# Patient Record
Sex: FEMALE | Race: WHITE | Employment: FULL TIME | URBAN - NONMETROPOLITAN AREA
[De-identification: names, ages, dates, MRNs, and addresses within clinical notes are randomized per-mention and may not be internally consistent; named-entity substitution may affect disease eponyms.]

---

## 2017-01-10 ENCOUNTER — COMMUNICATION - GICH (OUTPATIENT)
Dept: FAMILY MEDICINE | Facility: OTHER | Age: 32
End: 2017-01-10

## 2017-01-10 DIAGNOSIS — R30.0 DYSURIA: ICD-10-CM

## 2017-01-10 DIAGNOSIS — N39.0 URINARY TRACT INFECTION: ICD-10-CM

## 2018-01-02 NOTE — TELEPHONE ENCOUNTER
"Patient Information     Patient Name MRN Laverne Mccormick [id number] Female 1985      Telephone Encounter by Daria Encinas RN at 1/10/2017  1:51 PM     Author:  Daria Encinas RN Service:  (none) Author Type:  NURS- Registered Nurse     Filed:  1/10/2017  1:52 PM Encounter Date:  1/10/2017 Status:  Signed     :  Daria Encinas RN (NURS- Registered Nurse)            Fax received from pharmacy stating \"please disregard refill request\"  Daria Encinas RN........1/10/2017 1:51 PM;e          "

## 2018-01-29 ENCOUNTER — DOCUMENTATION ONLY (OUTPATIENT)
Dept: FAMILY MEDICINE | Facility: OTHER | Age: 33
End: 2018-01-29

## 2018-01-29 PROBLEM — Z72.0 TOBACCO ABUSE: Status: ACTIVE | Noted: 2018-01-29

## 2018-01-29 PROBLEM — N94.6 DYSMENORRHEA: Status: ACTIVE | Noted: 2018-01-29

## 2018-01-29 PROBLEM — F33.9 MAJOR DEPRESSIVE DISORDER, RECURRENT EPISODE (H): Status: ACTIVE | Noted: 2018-01-29

## 2018-01-29 PROBLEM — R51.9 HEADACHE: Status: ACTIVE | Noted: 2018-01-29

## 2018-01-29 RX ORDER — MULTIVIT WITH MINERALS/LUTEIN
1000 TABLET ORAL DAILY
COMMUNITY

## 2018-01-29 RX ORDER — ONDANSETRON 4 MG/1
4 TABLET, ORALLY DISINTEGRATING ORAL EVERY 8 HOURS PRN
COMMUNITY
Start: 2016-10-19

## 2022-06-15 ENCOUNTER — TELEPHONE (OUTPATIENT)
Dept: FAMILY MEDICINE | Facility: OTHER | Age: 37
End: 2022-06-15

## 2022-06-15 NOTE — TELEPHONE ENCOUNTER
....Left message to call back.  Duong Rushing, LPNLPN....................  6/15/2022   11:59 AM

## 2022-06-15 NOTE — TELEPHONE ENCOUNTER
LMTCB and schedule per op with [facility] ok to use same day.    Anny Avendano on 6/15/2022 at 12:14 PM

## 2022-06-15 NOTE — TELEPHONE ENCOUNTER
Patient needs a preop done by 06/28/22 for cataracts surgery by Dr Ba[facility]uelos, [facility] in [place].      Can someone work patient into their schedule.    Thank you   Melissa Valiente on 6/15/2022 at 11:33 AM

## 2024-09-03 ENCOUNTER — OFFICE VISIT (OUTPATIENT)
Dept: FAMILY MEDICINE | Facility: OTHER | Age: 39
End: 2024-09-03
Attending: FAMILY MEDICINE
Payer: COMMERCIAL

## 2024-09-03 VITALS
OXYGEN SATURATION: 100 % | BODY MASS INDEX: 28.61 KG/M2 | WEIGHT: 155.5 LBS | TEMPERATURE: 98.1 F | HEIGHT: 62 IN | HEART RATE: 82 BPM | RESPIRATION RATE: 18 BRPM | DIASTOLIC BLOOD PRESSURE: 76 MMHG | SYSTOLIC BLOOD PRESSURE: 130 MMHG

## 2024-09-03 DIAGNOSIS — R39.15 URINARY URGENCY: ICD-10-CM

## 2024-09-03 DIAGNOSIS — N39.0 ACUTE UTI (URINARY TRACT INFECTION): Primary | ICD-10-CM

## 2024-09-03 DIAGNOSIS — N89.8 VAGINAL ITCHING: ICD-10-CM

## 2024-09-03 DIAGNOSIS — R35.0 URINARY FREQUENCY: ICD-10-CM

## 2024-09-03 DIAGNOSIS — N89.8 VAGINAL DISCHARGE: ICD-10-CM

## 2024-09-03 DIAGNOSIS — B37.31 CANDIDIASIS OF VAGINA: ICD-10-CM

## 2024-09-03 DIAGNOSIS — R31.29 OTHER MICROSCOPIC HEMATURIA: ICD-10-CM

## 2024-09-03 LAB
ALBUMIN UR-MCNC: 10 MG/DL
APPEARANCE UR: CLEAR
BACTERIA #/AREA URNS HPF: ABNORMAL /HPF
BACTERIAL VAGINOSIS VAG-IMP: NEGATIVE
BILIRUB UR QL STRIP: NEGATIVE
CANDIDA DNA VAG QL NAA+PROBE: DETECTED
CANDIDA GLABRATA / CANDIDA KRUSEI DNA: NOT DETECTED
COLOR UR AUTO: YELLOW
GLUCOSE UR STRIP-MCNC: NEGATIVE MG/DL
HGB UR QL STRIP: NEGATIVE
KETONES UR STRIP-MCNC: 100 MG/DL
LEUKOCYTE ESTERASE UR QL STRIP: ABNORMAL
MUCOUS THREADS #/AREA URNS LPF: PRESENT /LPF
NITRATE UR QL: NEGATIVE
PH UR STRIP: 5.5 [PH] (ref 5–9)
RBC URINE: 4 /HPF
SP GR UR STRIP: 1.02 (ref 1–1.03)
SQUAMOUS EPITHELIAL: 2 /HPF
T VAGINALIS DNA VAG QL NAA+PROBE: NOT DETECTED
UROBILINOGEN UR STRIP-MCNC: NORMAL MG/DL
WBC URINE: 13 /HPF

## 2024-09-03 PROCEDURE — 81001 URINALYSIS AUTO W/SCOPE: CPT | Mod: ZL

## 2024-09-03 PROCEDURE — 87086 URINE CULTURE/COLONY COUNT: CPT | Mod: ZL

## 2024-09-03 PROCEDURE — 99203 OFFICE O/P NEW LOW 30 MIN: CPT

## 2024-09-03 PROCEDURE — 0352U MULTIPLEX VAGINAL PANEL BY PCR: CPT | Mod: ZL

## 2024-09-03 RX ORDER — SULFAMETHOXAZOLE/TRIMETHOPRIM 800-160 MG
1 TABLET ORAL 2 TIMES DAILY
Qty: 10 TABLET | Refills: 0 | Status: SHIPPED | OUTPATIENT
Start: 2024-09-03 | End: 2024-09-08

## 2024-09-03 ASSESSMENT — PAIN SCALES - GENERAL: PAINLEVEL: NO PAIN (0)

## 2024-09-03 NOTE — NURSING NOTE
"Chief Complaint   Patient presents with    Vaginal Problem     Inflammation, yellow discharge, itching x 8 days       Initial /76 (BP Location: Left arm, Patient Position: Sitting, Cuff Size: Adult Regular)   Pulse 82   Temp 98.1  F (36.7  C) (Tympanic)   Resp 18   Ht 1.575 m (5' 2\")   Wt 70.5 kg (155 lb 8 oz)   SpO2 100%   BMI 28.44 kg/m   Estimated body mass index is 28.44 kg/m  as calculated from the following:    Height as of this encounter: 1.575 m (5' 2\").    Weight as of this encounter: 70.5 kg (155 lb 8 oz).  Medication Review: complete    The next two questions are to help us understand your food security.  If you are feeling you need any assistance in this area, we have resources available to support you today.           No data to display                  Health Care Directive:  Patient does not have a Health Care Directive or Living Will: Discussed advance care planning with patient; however, patient declined at this time.    Jaz Beatty LPN      "

## 2024-09-03 NOTE — PROGRESS NOTES
ASSESSMENT/PLAN:    I have reviewed the nursing notes.  I have reviewed the findings, diagnosis, plan and need for follow up with the patient.    1. Urinary urgency  2. Urinary frequency  3. Vaginal itching  4. Vaginal discharge    - UA with Microscopic reflex to Culture- Ketones 100, protein albumin 10, moderate amount of leukocytes, few bacteria, mucus present, RBC urine > 4, WBC urine > 13, > 2 epithelials.     - Multiplex Vaginal Panel by PCR- Positive for yeast.    - Urine Culture- Results pending    5. Acute UTI (urinary tract infection)  6. Other microscopic hematuria    - sulfamethoxazole-trimethoprim (BACTRIM DS) 800-160 MG tablet; Take 1 tablet by mouth 2 times daily for 5 days.  Dispense: 10 tablet; Refill: 0    - Please read the attached information on bladder diet and UTI in women for at home care treatment.    7. Candidiasis of vagina    - fluconazole (DIFLUCAN) 200 MG tablet; Take 1 tablet (200 mg) by mouth every 3 days for 3 doses.  Dispense: 3 tablet; Refill: 0    - Please read the attached information on vaginal yeast infection for at home care treatment.     - May use over-the-counter Tylenol or ibuprofen as needed for pain, inflammation or fever    - Discussed warning signs/symptoms indicative of need to f/u    - Follow up if symptoms persist or worsen or concerns    - I explained my diagnostic considerations and recommendations to the patient, who voiced understanding and agreement with the treatment plan. All questions were answered. We discussed potential side effects of any prescribed or recommended therapies, as well as expectations for response to treatments.    KASH Riley CNP  9/3/2024  5:43 PM    HPI:    Laverne Medina is a 38 year old female who presents to [facility] today for concerns of possible yeast infection.  Patient states that for the past several days she has been experiencing urinary urgency and frequency along with an abnormal amount of yellowish colored vaginal  "discharge and vaginal itching.  Patient denies fever, chills, low back or flank pain, hematuria, foul-smelling discharge or urine, shortness of breath, chest pain, nausea, vomiting, diarrhea, constipation, headache, lightheadedness, dizziness or rash.  States that she has increased her water intake the past couple of days but otherwise has not done any over-the-counter medications.    Past Medical History:   Diagnosis Date    Appendicitis     2002    Personal history of other medical treatment (CODE)     G2 P 1-1-0-2    Presence of (intrauterine) contraceptive device     08,ParaGard IUD placed, removed 09 after her  had a vasectomy.     Past Surgical History:   Procedure Laterality Date    APPENDECTOMY OPEN      02,Open appendectomy for acute appendicitis     SECTION      06     SECTION      2008,Repeat  section -  labor     Social History     Tobacco Use    Smoking status: Former     Current packs/day: 0.50     Average packs/day: 0.5 packs/day for 7.0 years (3.5 ttl pk-yrs)     Types: Cigarettes    Smokeless tobacco: Never   Substance Use Topics    Alcohol use: Yes     Comment: Alcoholic Drinks/day: moderate     Current Outpatient Medications   Medication Sig Dispense Refill    ascorbic acid (VITAMIN C) 1000 MG TABS Take 1,000 mg by mouth daily      ondansetron (ZOFRAN-ODT) 4 MG ODT tab Place 4 mg under the tongue every 8 hours as needed (Patient not taking: Reported on 9/3/2024)       Allergies   Allergen Reactions    Latex Rash    Nitrofurantoin Rash     Past medical history, past surgical history, current medications and allergies reviewed and accurate to the best of my knowledge.      ROS:  Refer to HPI    /76 (BP Location: Left arm, Patient Position: Sitting, Cuff Size: Adult Regular)   Pulse 82   Temp 98.1  F (36.7  C) (Tympanic)   Resp 18   Ht 1.575 m (5' 2\")   Wt 70.5 kg (155 lb 8 oz)   SpO2 100%   BMI 28.44 kg/m  "     EXAM:  General Appearance: Well appearing 38 year old female, appropriate appearance for age. No acute distress   Respiratory: normal chest wall and respirations.  Normal effort.  Clear to auscultation bilaterally, no wheezing, crackles or rhonchi.  No increased work of breathing.  No cough appreciated.  Cardiac: RRR with no murmurs  Abdomen: soft, nontender, no rigidity, no rebound tenderness or guarding, normal bowel sounds present  :  No suprapubic tenderness to palpation.  Absent CVA tenderness to palpation.    Musculoskeletal:  Equal movement of bilateral upper extremities.  Equal movement of bilateral lower extremities.  Normal gait.    Neuro: Alert and oriented to person, place, and time.    Psychological: normal affect, alert, oriented, and pleasant.     Labs:  Results for orders placed or performed in visit on 09/03/24   UA with Microscopic reflex to Culture     Status: Abnormal    Specimen: Urine, Midstream   Result Value Ref Range    Color Urine Yellow Colorless, Straw, Light Yellow, Yellow    Appearance Urine Clear Clear    Glucose Urine Negative Negative mg/dL    Bilirubin Urine Negative Negative    Ketones Urine 100 (A) Negative mg/dL    Specific Gravity Urine 1.018 1.000 - 1.030    Blood Urine Negative Negative    pH Urine 5.5 5.0 - 9.0    Protein Albumin Urine 10 (A) Negative mg/dL    Urobilinogen Urine Normal Normal, 2.0 mg/dL    Nitrite Urine Negative Negative    Leukocyte Esterase Urine Moderate (A) Negative    Bacteria Urine Few (A) None Seen /HPF    Mucus Urine Present (A) None Seen /LPF    RBC Urine 4 (H) <=2 /HPF    WBC Urine 13 (H) <=5 /HPF    Squamous Epithelials Urine 2 (H) <=1 /HPF    Narrative    Urine Culture ordered based on laboratory criteria   Multiplex Vaginal Panel by PCR     Status: Abnormal    Specimen: Vagina; Swab   Result Value Ref Range    Bacterial Vaginosis Organism DNA Negative Negative    Candida Group DNA Detected (A) Not Detected    Candida glabrata / Candida  krusei DNA Not Detected Not Detected    Trichomonas vaginalis DNA Not Detected Not Detected    Narrative    The Xpert  Xpress MVP test, performed on the [employer], is an automated, qualitative in vitro diagnostic test for the detection of DNA targets from anaerobic bacteria associated with bacterial vaginosis, Candida species associated with vulvovaginal candidiasis, and Trichomonas vaginalis. The assay uses clinician-collected and self-collected vaginal swabs from patients who are symptomatic for vaginitis/ vaginosis. The Xpert  Xpress MVP test utilizes real-time polymerase chain reaction (PCR) for the amplification of specific DNA targets and utilizes fluorogenic target-specific hybridization probes to detect and differentiate DNA. It is intended to aid in the diagnosis of vaginal infections in women with a clinical presentation consistent with bacterial vaginosis, vulvovaginal candidiasis, or trichomoniasis.   The assay targets three anaerobic microorgansims that are associated with bacterial vaginosis (BV). Other organisms that are not detected by the Xpert  Xpress MVP test have also been reported to be associated with BV. The BV organism and Candida species targets of the Xpert  Xpress MVP test can be commensal in women; positive results must be considered in conjunction with other clinical and patient information to determine the disease status.

## 2024-09-04 RX ORDER — FLUCONAZOLE 200 MG/1
200 TABLET ORAL
Qty: 3 TABLET | Refills: 0 | Status: SHIPPED | OUTPATIENT
Start: 2024-09-04 | End: 2024-09-11

## 2024-09-04 NOTE — PATIENT INSTRUCTIONS
Urinary Tract Infection (UTI) in Women: Care Instructions  Overview     A urinary tract infection (UTI) is an infection caused by bacteria. It can happen anywhere in the urinary tract. A UTI can happen in the:  Kidneys.  Ureters, the tubes that connect the kidneys to the bladder.  Bladder.  Urethra, where the urine comes out.  Most UTIs are bladder infections. They often cause pain or burning when you urinate.  Most UTIs can be cured with antibiotics. If you are prescribed antibiotics, be sure to complete your treatment so that the infection does not get worse.  Follow-up care is a key part of your treatment and safety. Be sure to make and go to all appointments, and call your doctor if you are having problems. It's also a good idea to know your test results and keep a list of the medicines you take.  How can you care for yourself at home?  Take your antibiotics as directed. Do not stop taking them just because you feel better. You need to take the full course of antibiotics.  Drink extra water and other fluids for the next day or two. This will help make the urine less concentrated and help wash out the bacteria that are causing the infection. (If you have kidney, heart, or liver disease and have to limit fluids, talk with your doctor before you increase the amount of fluids you drink.)  Avoid drinks that are carbonated or have caffeine. They can irritate the bladder.  Urinate often. Try to empty your bladder each time.  To relieve pain, take a hot bath or lay a heating pad set on low over your lower belly or genital area. Never go to sleep with a heating pad in place.  To prevent UTIs  Drink plenty of water each day. This helps you urinate often, which clears bacteria from your system. (If you have kidney, heart, or liver disease and have to limit fluids, talk with your doctor before you increase the amount of fluids you drink.)  Urinate when you need to.  If you are sexually active, urinate right after you have  "sex.  Change sanitary pads often.  Avoid douches, bubble baths, feminine hygiene sprays, and other feminine hygiene products that have deodorants.  After going to the bathroom, wipe from front to back.  When should you call for help?   Call your doctor now or seek immediate medical care if:    You have new or worse fever, chills, nausea, or vomiting.     You have new pain in your back just below your rib cage. This is called flank pain.     There is new blood or pus in your urine.     You have any problems with your antibiotic medicine.   Watch closely for changes in your health, and be sure to contact your doctor if:    You are not getting better after taking an antibiotic for 2 days.     Your symptoms go away but then come back.   Where can you learn more?  Go to https://www.[employer].net/patiented  Enter K848 in the search box to learn more about \"Urinary Tract Infection (UTI) in Women: Care Instructions.\"  Current as of: November 15, 2023               Content Version: 14.0    1330-6930 [employer].   Care instructions adapted under license by your healthcare professional. If you have questions about a medical condition or this instruction, always ask your healthcare professional. [employer] disclaims any warranty or liability for your use of this information.      "

## 2024-09-04 NOTE — RESULT ENCOUNTER NOTE
Please let patient know that she was positive for yeast infection as well so I sent in RX for diflucan to take every 3 days for total of three doses.

## 2024-09-05 LAB — BACTERIA UR CULT: NORMAL

## 2024-09-05 NOTE — RESULT ENCOUNTER NOTE
Please let patient know that her urine culture grew a mixture of organism types,  go ahead and continue her antibiotic as prescribed

## 2024-11-19 ENCOUNTER — OFFICE VISIT (OUTPATIENT)
Dept: FAMILY MEDICINE | Facility: OTHER | Age: 39
End: 2024-11-19
Attending: NURSE PRACTITIONER
Payer: COMMERCIAL

## 2024-11-19 VITALS
DIASTOLIC BLOOD PRESSURE: 80 MMHG | HEART RATE: 71 BPM | HEIGHT: 62 IN | WEIGHT: 151 LBS | SYSTOLIC BLOOD PRESSURE: 144 MMHG | OXYGEN SATURATION: 98 % | BODY MASS INDEX: 27.79 KG/M2 | RESPIRATION RATE: 16 BRPM | TEMPERATURE: 98.4 F

## 2024-11-19 DIAGNOSIS — R21 RASH: ICD-10-CM

## 2024-11-19 DIAGNOSIS — Z20.818 EXPOSURE TO STREP THROAT: ICD-10-CM

## 2024-11-19 DIAGNOSIS — R07.0 THROAT PAIN: ICD-10-CM

## 2024-11-19 DIAGNOSIS — J02.0 ACUTE STREPTOCOCCAL PHARYNGITIS: Primary | ICD-10-CM

## 2024-11-19 LAB — GROUP A STREP BY PCR: NOT DETECTED

## 2024-11-19 PROCEDURE — 87651 STREP A DNA AMP PROBE: CPT | Mod: ZL

## 2024-11-19 RX ORDER — PENICILLIN V POTASSIUM 500 MG/1
500 TABLET, FILM COATED ORAL 2 TIMES DAILY
Qty: 20 TABLET | Refills: 0 | Status: SHIPPED | OUTPATIENT
Start: 2024-11-19 | End: 2024-11-29

## 2024-11-19 RX ORDER — TRIAMCINOLONE ACETONIDE 1 MG/G
CREAM TOPICAL
Qty: 45 G | Refills: 0 | Status: SHIPPED | OUTPATIENT
Start: 2024-11-19

## 2024-11-19 ASSESSMENT — ENCOUNTER SYMPTOMS
SINUS PAIN: 0
ENDOCRINE NEGATIVE: 1
COUGH: 0
SINUS PRESSURE: 0
MUSCULOSKELETAL NEGATIVE: 1
VOMITING: 0
CARDIOVASCULAR NEGATIVE: 1
SORE THROAT: 1
SHORTNESS OF BREATH: 0
EYES NEGATIVE: 1
HEMATOLOGIC/LYMPHATIC NEGATIVE: 1
FEVER: 1
NEUROLOGICAL NEGATIVE: 1
NAUSEA: 0
ALLERGIC/IMMUNOLOGIC NEGATIVE: 1

## 2024-11-19 ASSESSMENT — PAIN SCALES - GENERAL: PAINLEVEL_OUTOF10: SEVERE PAIN (6)

## 2024-11-19 NOTE — PROGRESS NOTES
Laverne Medina  1985    ASSESSMENT/PLAN    1. Acute streptococcal pharyngitis (Primary)  - penicillin V (VEETID) 500 MG tablet; Take 1 tablet (500 mg) by mouth 2 times daily for 10 days.  Dispense: 20 tablet; Refill: 0  2. Exposure to strep throat  - Group A Streptococcus PCR Throat Swab  3. Rash  - Group A Streptococcus PCR Throat Swab  - triamcinolone (KENALOG) 0.1 % external cream; Apply to affected areas of skin 1-2 times per day for up to 1 week at a time.  Dispense: 45 g; Refill: 0  4. Throat pain  - Group A Streptococcus PCR Throat Swab      -Based on centor criteria(4/5 points, 51-53% likelihood of strep per MD Calc) we will treat empirically with penicillin V even though strep test is negative.  - Rash seems most likely related to sequela from strep infection. If this does not resolve after treatment with penicillin v, please follow up for further workup. Triamcinolone cream was provided for itch.  - Symptomatic treatment - Encouraged fluids, salt water gargles, honey, humidifier, saline nasal spray, lozenges, tea, soup, smoothies, popsicles, topical vapor rub, rest, etc   - May use over-the-counter Tylenol or ibuprofen PRN  - Follow up as needed for new or worsening symptoms          *Explanation of diagnosis, treatment options and risk and benefits of medications reviewed with patient. Patient agrees with plan of care.  *All questions were answered.    *Red flags symptoms were discussed and patient was advised when they should return for reevaluation or for prompt emergency evaluation.   *Patient was given verbal and written instructions on plan of care. Instructions were printed or are available on [employer] on electronic AVS.   *We discussed potential side effects of any prescribed or recommended therapies, as well as expectations for response to treatments.  *Patient discharged in stable condition    Bonifacio Trammell, MARYAN, APRN, FNP-C  [facility]    SUBJECTIVE  CHIEF COMPLAINT/  "REASON FOR VISIT  Patient presents with:  Throat Problem: X 6 days  Derm Problem: This am     HISTORY OF PRESENT ILLNESS  Laverne Medina is a pleasant 38 year old female [facility] and rash.  Approximately 6 days ago patient developed a sore throat.  She did have a known exposure to strep while at work. On Saturday and Sunday of this week she also developed a fever and the pain in her throat remained 7 out of 10 pain.  She endorses that her throat feels like cotton and very painful.  She has been taking Tylenol since Saturday due to fever.  Today she developed a widespread rash primarily to anterior chest and anterior arms.  This rash is itchy.  She also endorses a swollen lymph node to the left lateral neck and tenderness to anterior neck.       History provided by patient      I have reviewed the nursing notes.  I have reviewed allergies, medication list, problem list, and past medical history.    REVIEW OF SYSTEMS  Review of Systems   Constitutional:  Positive for fever.   HENT:  Positive for sore throat. Negative for ear pain, sinus pressure and sinus pain.    Eyes: Negative.    Respiratory:  Negative for cough and shortness of breath.    Cardiovascular: Negative.    Gastrointestinal:  Negative for nausea and vomiting.   Endocrine: Negative.    Genitourinary: Negative.    Musculoskeletal: Negative.    Skin:  Positive for rash.   Allergic/Immunologic: Negative.    Neurological: Negative.    Hematological: Negative.         VITAL SIGNS  Vitals:    11/19/24 1502 11/19/24 1503   BP: (!) 144/85 (!) 144/80   BP Location: Right arm Right arm   Patient Position: Sitting Sitting   Cuff Size: Adult Regular Adult Regular   Pulse: 71    Resp: 16    Temp: 98.4  F (36.9  C)    TempSrc: Tympanic    SpO2: 98%    Weight: 68.5 kg (151 lb)    Height: 1.575 m (5' 2\")       Body mass index is 27.62 kg/m .      OBJECTIVE  PHYSICAL EXAM  Physical Exam  Vitals and nursing note reviewed. "   Constitutional:       General: She is not in acute distress.     Appearance: Normal appearance. She is normal weight. She is not ill-appearing, toxic-appearing or diaphoretic.   HENT:      Head: Normocephalic and atraumatic.      Right Ear: Tympanic membrane, ear canal and external ear normal. There is no impacted cerumen.      Left Ear: Tympanic membrane, ear canal and external ear normal. There is no impacted cerumen.      Nose: Nose normal. No congestion or rhinorrhea.      Mouth/Throat:      Mouth: Mucous membranes are moist.      Pharynx: Uvula midline. Posterior oropharyngeal erythema present. No uvula swelling.      Tonsils: No tonsillar exudate.      Comments: Small ulcer to posterior pharynx  Eyes:      Extraocular Movements: Extraocular movements intact.      Conjunctiva/sclera: Conjunctivae normal.      Pupils: Pupils are equal, round, and reactive to light.   Cardiovascular:      Rate and Rhythm: Normal rate and regular rhythm.      Pulses: Normal pulses.      Heart sounds: Normal heart sounds. No murmur heard.  Pulmonary:      Effort: Pulmonary effort is normal. No respiratory distress.      Breath sounds: Normal breath sounds. No wheezing or rhonchi.   Abdominal:      General: Abdomen is flat. Bowel sounds are normal.      Palpations: Abdomen is soft.   Musculoskeletal:      Cervical back: Normal range of motion and neck supple. Tenderness present. No rigidity.   Lymphadenopathy:      Cervical: Cervical adenopathy present.   Skin:     General: Skin is warm and dry.      Capillary Refill: Capillary refill takes less than 2 seconds.      Findings: Erythema and rash present. Rash is macular, papular and purpuric.      Comments: Erythematous maculopapular rash to anterior chest and anterior arms   Neurological:      Mental Status: She is alert.            DIAGNOSTICS  Results for orders placed or performed in visit on 11/19/24   Group A Streptococcus PCR Throat Swab     Status: Normal    Specimen:  Throat; Swab   Result Value Ref Range    Group A strep by PCR Not Detected Not Detected    Narrative    The Xpert Xpress Strep A test, performed on the [employer], is a rapid, qualitative in vitro diagnostic test for the detection of Streptococcus pyogenes (Group A ß-hemolytic Streptococcus, Strep A) in throat swab specimens from patients with signs and symptoms of pharyngitis. The Xpert Xpress Strep A test can be used as an aid in the diagnosis of Group A Streptococcal pharyngitis. The assay is not intended to monitor treatment for Group A Streptococcus infections. The Xpert Xpress Strep A test utilizes an automated real-time polymerase chain reaction (PCR) to detect Streptococcus pyogenes DNA.

## 2024-11-19 NOTE — NURSING NOTE
"Chief Complaint   Patient presents with    Throat Problem     X 6 days    Derm Problem     This am     Patient tx with tylenol  Exposure to strep at work    Initial BP (!) 144/80 (BP Location: Right arm, Patient Position: Sitting, Cuff Size: Adult Regular)   Pulse 71   Temp 98.4  F (36.9  C) (Tympanic)   Resp 16   Ht 1.575 m (5' 2\")   Wt 68.5 kg (151 lb)   SpO2 98%   BMI 27.62 kg/m   Estimated body mass index is 27.62 kg/m  as calculated from the following:    Height as of this encounter: 1.575 m (5' 2\").    Weight as of this encounter: 68.5 kg (151 lb).     Advance Care Directive on file? n    FOOD SECURITY SCREENING QUESTIONS:    The next two questions are to help us understand your food security.  If you are feeling you need any assistance in this area, we have resources available to support you today.    Hunger Vital Signs:  Within the past 12 months we worried whether our food would run out before we got money to buy more. Never  Within the past 12 months the food we bought just didn't last and we didn't have money to get more. Never  Penelope Ayala LPN,LPN on 11/19/2024 at 3:05 PM      Penelope Ayala LPN     "

## 2024-11-26 ENCOUNTER — OFFICE VISIT (OUTPATIENT)
Dept: OBGYN | Facility: OTHER | Age: 39
End: 2024-11-26
Attending: STUDENT IN AN ORGANIZED HEALTH CARE EDUCATION/TRAINING PROGRAM
Payer: COMMERCIAL

## 2024-11-26 VITALS — HEART RATE: 75 BPM | SYSTOLIC BLOOD PRESSURE: 122 MMHG | DIASTOLIC BLOOD PRESSURE: 72 MMHG

## 2024-11-26 DIAGNOSIS — N89.8 VAGINAL DISCHARGE: ICD-10-CM

## 2024-11-26 DIAGNOSIS — R10.2 PELVIC PAIN IN FEMALE: Primary | ICD-10-CM

## 2024-11-26 LAB
BACTERIAL VAGINOSIS VAG-IMP: NEGATIVE
CANDIDA DNA VAG QL NAA+PROBE: NOT DETECTED
CANDIDA GLABRATA / CANDIDA KRUSEI DNA: NOT DETECTED
T VAGINALIS DNA VAG QL NAA+PROBE: NOT DETECTED

## 2024-11-26 PROCEDURE — 0352U MULTIPLEX VAGINAL PANEL BY PCR: CPT | Mod: ZL | Performed by: STUDENT IN AN ORGANIZED HEALTH CARE EDUCATION/TRAINING PROGRAM

## 2024-11-26 RX ORDER — TRIAMCINOLONE ACETONIDE 1 MG/G
CREAM TOPICAL 2 TIMES DAILY
Qty: 45 G | Refills: 0 | Status: SHIPPED | OUTPATIENT
Start: 2024-11-26 | End: 2024-12-03

## 2024-11-26 RX ORDER — CLOTRIMAZOLE 1 %
1 CREAM WITH APPLICATOR VAGINAL AT BEDTIME
Qty: 45 G | Refills: 0 | Status: SHIPPED | OUTPATIENT
Start: 2024-11-26 | End: 2024-12-06

## 2024-11-26 ASSESSMENT — PATIENT HEALTH QUESTIONNAIRE - PHQ9
SUM OF ALL RESPONSES TO PHQ QUESTIONS 1-9: 0
10. IF YOU CHECKED OFF ANY PROBLEMS, HOW DIFFICULT HAVE THESE PROBLEMS MADE IT FOR YOU TO DO YOUR WORK, TAKE CARE OF THINGS AT HOME, OR GET ALONG WITH OTHER PEOPLE: NOT DIFFICULT AT ALL
SUM OF ALL RESPONSES TO PHQ QUESTIONS 1-9: 0

## 2024-11-26 ASSESSMENT — PAIN SCALES - GENERAL: PAINLEVEL_OUTOF10: MODERATE PAIN (5)

## 2024-11-26 NOTE — NURSING NOTE
"Chief Complaint   Patient presents with    Consult     Patient is here for consult to discuss hormones. Patient states that she feels like a cooling sensation that comes and goes, pelvic pain that comes and goes. Patient is not having any abnormal discharge or odor. Patient is wanting labs to be checked for hormones and DM labs.   Initial /72   Pulse 75  Estimated body mass index is 27.62 kg/m  as calculated from the following:    Height as of 11/19/24: 1.575 m (5' 2\").    Weight as of 11/19/24: 68.5 kg (151 lb).  Medication Reconciliation: complete      Jeimy Adler LPN    "

## 2024-11-26 NOTE — PROGRESS NOTES
"Gynecology Office Visit    Chief Complaint: Hormone questions    HPI:    Laverne Medina is a 39 year old  here for discussion of hormones and concern for persistent vaginal yeast infection. She notes that her perineum and vagina have a tingling, \"cold\" feeling that has been persistent throughout this fall. She has previously been tested for bacterial vaginosis, yeast and Trichomonas. She tested positive for yeast and was treated with diflucan, it then returned in November. She was then given a three day course treatment.    She has a gyn history of a laparoscopic-assisted vaginal hysterectomy, bilateral salpingectomy, right oophorectomy in 2016. The right ovary had a mature cystic teratoma present (path reviewed from 2016).    She notes intermittent lower pelvic pain. It seems to start on her left side but radiates and settles across the midline as well.    OBHx  OB History   No obstetric history on file.   Two prior C-sections      GYN history:   Last pap smear  NIL, No history of abnormal pap smears   No longer has a cervix: surgically removed with vaginal hysterectomy in .       Past medical history:  Past Medical History:   Diagnosis Date    Appendicitis     2002    Personal history of other medical treatment (CODE)     G2 P 1-1-0-2    Presence of (intrauterine) contraceptive device     08,ParaGard IUD placed, removed 09 after her  had a vasectomy.     Patient Active Problem List   Diagnosis    Allergic rhinitis due to pollen    Dysmenorrhea    Headache    Major depressive disorder, recurrent episode (H)    Plantar wart    Tobacco abuse       Past Surgical History:  Past Surgical History:   Procedure Laterality Date    APPENDECTOMY OPEN      02,Open appendectomy for acute appendicitis     SECTION      06     SECTION      2008,Repeat  section -  labor    LAPAROSCOPIC ASSISTED HYSTERECTOMY VAGINAL  2016    laparoscopic " assisted vaginal hysterectomy, bilateral salpingectomy, right ovarian cystectomy, subsequent right oophorectomy, messina culdoplasty, cystoscopy       Medications:  Current Outpatient Medications   Medication Sig Dispense Refill    ascorbic acid (VITAMIN C) 1000 MG TABS Take 1,000 mg by mouth daily      ondansetron (ZOFRAN-ODT) 4 MG ODT tab Place 4 mg under the tongue every 8 hours as needed.      penicillin V (VEETID) 500 MG tablet Take 1 tablet (500 mg) by mouth 2 times daily for 10 days. 20 tablet 0    triamcinolone (KENALOG) 0.1 % external cream Apply to affected areas of skin 1-2 times per day for up to 1 week at a time. 45 g 0     No current facility-administered medications for this visit.       Allergies:       Allergies   Allergen Reactions    Latex Rash    Nitrofurantoin Rash       Social History:  Social History     Tobacco Use    Smoking status: Former     Current packs/day: 0.50     Average packs/day: 0.5 packs/day for 7.0 years (3.5 ttl pk-yrs)     Types: Cigarettes    Smokeless tobacco: Never   Vaping Use    Vaping status: Every Day    Substances: Nicotine   Substance Use Topics    Alcohol use: Yes     Comment: Alcoholic Drinks/day: moderate    Drug use: Unknown     Types: Other     Comment: Drug use: No       Family History:  Family History   Problem Relation Age of Onset    Heart Disease Mother         Heart Disease    Other - See Comments Mother     Other - See Comments Father     Genetic Disorder Other         Genetic,Sister Severe migraines followed by Dr. Bhardwaj -Mom Appendectomy, hysterectomy and hyperthyroidism.    Genetic Disorder Other         Genetic,Sister  age 19 of pneumonia-Mom Appendectomy, hysterectomy and hyperthyroidism.  -Father - Type II DM    Blood Disease Brother         Blood Disease    Hypertension Sister         Hypertension    Other - See Comments Sister      Specifically denies breast, ovarian, colon, pancreatic cancers  Specifically denies VTE, known familial  thrombophilias and coagulopathies    ROS:   Respiratory: No shortness of breath, dyspnea on exertion, cough, or hemoptysis  Cardiovascular: negative for palpitations, chest pain, lower extremity edema and syncope or near-syncope  Gastrointestinal: negative for, nausea, vomiting and hematemesis  Genitourinary: negative for, dysuria, frequency and urgency  Musculoskeletal: negative for, back pain and muscular weakness  Psychiatric: negative for, anxiety, depression and hallucinations  Hematologic/Lymphatic/Immunologic: negative for, anemia, chills and fever      Physical Exam  /72   Pulse 75   Gen: Well-appearing, no acute distressed, well-groomed, alert  HEENT: Normocephalic, atraumatic  Cardiovascular: Regular rate, No peripheral edema, normal peripheral circulation  Pulm: Symmetric chest rise, non-labored respirations  Abd: Soft, non-tender, non-distended  Ext: No LE edema, extremities warm and well perfused  Pelvic:  Normal appearing external female genitalia. Normal hair distribution. Vagina is without lesions with moist, pink ruggae. There is a thick, white vaginal discharge. Cervix surgically absent. Skin folds along groin significant for mild erythema, no rash, discrete lesions, discharge or intertrigo.    Assessment/Plan  Laverne Medina is a 39 year old  female here for discussion of persistent vaginal infection and groin skin irritation, as well as pelvic pain.     # Pelvic pain  -- US ordered to assess the remaining left ovary: ensure no dermoid has developed on that side    # Vaginal infection  -- MVP collected today: exam highly suspicious for yeast with the thick cream-like discharge (she has not had any vaginal creams for prior treatment recently) Rx for clotrimazole sent.  -- Encouraged re-establishing vaginal pH after recent antibiotic use with rePhresh and probiotics    # Inguinal skin irritation  -- No rash noted on exam, but mild erythema present.  -- Rx for triamcinolone sent for 7  days to reduce inflammation    Total amount of time spent during today's encounter including chart prep, face to face, documentation was 25 minutes.     Jeimy Sanders MD on 11/26/2024 at 1:31 PM       Answers submitted by the patient for this visit:  Patient Health Questionnaire (Submitted on 11/26/2024)  If you checked off any problems, how difficult have these problems made it for you to do your work, take care of things at home, or get along with other people?: Not difficult at all  PHQ9 TOTAL SCORE: 0

## 2024-12-09 ENCOUNTER — TELEPHONE (OUTPATIENT)
Dept: OBGYN | Facility: OTHER | Age: 39
End: 2024-12-09
Payer: COMMERCIAL

## 2024-12-09 NOTE — TELEPHONE ENCOUNTER
Laverne left a message that she has a couple of questions for Dr. Sanders.  She was told that she could call if she had any other questions or concerns and would like a call back.  Melissa Scott on 12/9/2024 at 1:27 PM

## 2024-12-09 NOTE — TELEPHONE ENCOUNTER
After verifying pt last name and date of birth, pt states she is still having pelvic pain on and off. Rates it anywhere from 3-6 out of 10. Pt was given emergent signs and symptoms to report to ED with. Pt states she finished her yeast infection treatment and denies abnormal discharge, odor, and itchiness. Pt was encouraged to call back if symptoms arise.     Radha Alamo RN on 12/9/2024 at 1:37 PM

## 2024-12-16 ENCOUNTER — HOSPITAL ENCOUNTER (OUTPATIENT)
Dept: ULTRASOUND IMAGING | Facility: OTHER | Age: 39
Discharge: HOME | End: 2024-12-16
Attending: STUDENT IN AN ORGANIZED HEALTH CARE EDUCATION/TRAINING PROGRAM | Admitting: STUDENT IN AN ORGANIZED HEALTH CARE EDUCATION/TRAINING PROGRAM
Payer: COMMERCIAL

## 2024-12-16 DIAGNOSIS — R10.2 PELVIC PAIN IN FEMALE: ICD-10-CM

## 2024-12-16 PROCEDURE — 76856 US EXAM PELVIC COMPLETE: CPT

## 2024-12-16 PROCEDURE — 76830 TRANSVAGINAL US NON-OB: CPT

## 2025-01-21 ENCOUNTER — OFFICE VISIT (OUTPATIENT)
Dept: FAMILY MEDICINE | Facility: OTHER | Age: 40
End: 2025-01-21
Attending: FAMILY MEDICINE
Payer: COMMERCIAL

## 2025-01-21 VITALS
SYSTOLIC BLOOD PRESSURE: 112 MMHG | RESPIRATION RATE: 16 BRPM | TEMPERATURE: 98.8 F | HEIGHT: 62 IN | BODY MASS INDEX: 27.7 KG/M2 | HEART RATE: 89 BPM | OXYGEN SATURATION: 98 % | WEIGHT: 150.5 LBS | DIASTOLIC BLOOD PRESSURE: 76 MMHG

## 2025-01-21 DIAGNOSIS — R68.82 DECREASED LIBIDO: ICD-10-CM

## 2025-01-21 DIAGNOSIS — Z83.49 FAMILY HISTORY OF THYROID DISEASE: ICD-10-CM

## 2025-01-21 DIAGNOSIS — Z13.1 DIABETES MELLITUS SCREENING: ICD-10-CM

## 2025-01-21 DIAGNOSIS — F43.29 ADJUSTMENT DISORDER WITH MIXED EMOTIONAL FEATURES: ICD-10-CM

## 2025-01-21 DIAGNOSIS — Z13.220 LIPID SCREENING: ICD-10-CM

## 2025-01-21 DIAGNOSIS — Z00.00 ROUTINE HISTORY AND PHYSICAL EXAMINATION OF ADULT: Primary | ICD-10-CM

## 2025-01-21 DIAGNOSIS — Z23 NEED FOR TDAP VACCINATION: ICD-10-CM

## 2025-01-21 LAB
ANION GAP SERPL CALCULATED.3IONS-SCNC: 10 MMOL/L (ref 7–15)
BUN SERPL-MCNC: 9.1 MG/DL (ref 6–20)
CALCIUM SERPL-MCNC: 9.4 MG/DL (ref 8.8–10.4)
CHLORIDE SERPL-SCNC: 103 MMOL/L (ref 98–107)
CHOLEST SERPL-MCNC: 168 MG/DL
CREAT SERPL-MCNC: 0.71 MG/DL (ref 0.51–0.95)
EGFRCR SERPLBLD CKD-EPI 2021: >90 ML/MIN/1.73M2
FASTING STATUS PATIENT QL REPORTED: NO
FASTING STATUS PATIENT QL REPORTED: NO
GLUCOSE SERPL-MCNC: 101 MG/DL (ref 70–99)
HCO3 SERPL-SCNC: 28 MMOL/L (ref 22–29)
HDLC SERPL-MCNC: 75 MG/DL
LDLC SERPL CALC-MCNC: 74 MG/DL
NONHDLC SERPL-MCNC: 93 MG/DL
POTASSIUM SERPL-SCNC: 4.3 MMOL/L (ref 3.4–5.3)
SODIUM SERPL-SCNC: 141 MMOL/L (ref 135–145)
T4 FREE SERPL-MCNC: 1 NG/DL (ref 0.9–1.7)
TRIGL SERPL-MCNC: 97 MG/DL
TSH SERPL DL<=0.005 MIU/L-ACNC: 1.08 UIU/ML (ref 0.3–4.2)

## 2025-01-21 PROCEDURE — 84443 ASSAY THYROID STIM HORMONE: CPT | Mod: ZL | Performed by: FAMILY MEDICINE

## 2025-01-21 PROCEDURE — 82465 ASSAY BLD/SERUM CHOLESTEROL: CPT | Mod: ZL | Performed by: FAMILY MEDICINE

## 2025-01-21 PROCEDURE — 84520 ASSAY OF UREA NITROGEN: CPT | Mod: ZL | Performed by: FAMILY MEDICINE

## 2025-01-21 PROCEDURE — 84439 ASSAY OF FREE THYROXINE: CPT | Mod: ZL | Performed by: FAMILY MEDICINE

## 2025-01-21 PROCEDURE — 80048 BASIC METABOLIC PNL TOTAL CA: CPT | Mod: ZL | Performed by: FAMILY MEDICINE

## 2025-01-21 PROCEDURE — 83718 ASSAY OF LIPOPROTEIN: CPT | Mod: ZL | Performed by: FAMILY MEDICINE

## 2025-01-21 PROCEDURE — 36415 COLL VENOUS BLD VENIPUNCTURE: CPT | Mod: ZL | Performed by: FAMILY MEDICINE

## 2025-01-21 SDOH — HEALTH STABILITY: PHYSICAL HEALTH: ON AVERAGE, HOW MANY MINUTES DO YOU ENGAGE IN EXERCISE AT THIS LEVEL?: 30 MIN

## 2025-01-21 SDOH — HEALTH STABILITY: PHYSICAL HEALTH: ON AVERAGE, HOW MANY DAYS PER WEEK DO YOU ENGAGE IN MODERATE TO STRENUOUS EXERCISE (LIKE A BRISK WALK)?: 3 DAYS

## 2025-01-21 ASSESSMENT — ANXIETY QUESTIONNAIRES
GAD7 TOTAL SCORE: 0
7. FEELING AFRAID AS IF SOMETHING AWFUL MIGHT HAPPEN: NOT AT ALL
GAD7 TOTAL SCORE: 0
GAD7 TOTAL SCORE: 0
2. NOT BEING ABLE TO STOP OR CONTROL WORRYING: NOT AT ALL
5. BEING SO RESTLESS THAT IT IS HARD TO SIT STILL: NOT AT ALL
3. WORRYING TOO MUCH ABOUT DIFFERENT THINGS: NOT AT ALL
IF YOU CHECKED OFF ANY PROBLEMS ON THIS QUESTIONNAIRE, HOW DIFFICULT HAVE THESE PROBLEMS MADE IT FOR YOU TO DO YOUR WORK, TAKE CARE OF THINGS AT HOME, OR GET ALONG WITH OTHER PEOPLE: NOT DIFFICULT AT ALL
4. TROUBLE RELAXING: NOT AT ALL
7. FEELING AFRAID AS IF SOMETHING AWFUL MIGHT HAPPEN: NOT AT ALL
6. BECOMING EASILY ANNOYED OR IRRITABLE: NOT AT ALL
8. IF YOU CHECKED OFF ANY PROBLEMS, HOW DIFFICULT HAVE THESE MADE IT FOR YOU TO DO YOUR WORK, TAKE CARE OF THINGS AT HOME, OR GET ALONG WITH OTHER PEOPLE?: NOT DIFFICULT AT ALL
1. FEELING NERVOUS, ANXIOUS, OR ON EDGE: NOT AT ALL

## 2025-01-21 ASSESSMENT — PATIENT HEALTH QUESTIONNAIRE - PHQ9
10. IF YOU CHECKED OFF ANY PROBLEMS, HOW DIFFICULT HAVE THESE PROBLEMS MADE IT FOR YOU TO DO YOUR WORK, TAKE CARE OF THINGS AT HOME, OR GET ALONG WITH OTHER PEOPLE: NOT DIFFICULT AT ALL
SUM OF ALL RESPONSES TO PHQ QUESTIONS 1-9: 0
SUM OF ALL RESPONSES TO PHQ QUESTIONS 1-9: 0

## 2025-01-21 ASSESSMENT — PAIN SCALES - GENERAL: PAINLEVEL_OUTOF10: NO PAIN (0)

## 2025-01-21 ASSESSMENT — SOCIAL DETERMINANTS OF HEALTH (SDOH): HOW OFTEN DO YOU GET TOGETHER WITH FRIENDS OR RELATIVES?: THREE TIMES A WEEK

## 2025-01-21 NOTE — NURSING NOTE
"No chief complaint on file.      Initial /76   Pulse 89   Temp 98.8  F (37.1  C) (Tympanic)   Resp 16   Ht 1.575 m (5' 2\")   Wt 68.3 kg (150 lb 8 oz)   SpO2 98%   BMI 27.53 kg/m   Estimated body mass index is 27.53 kg/m  as calculated from the following:    Height as of this encounter: 1.575 m (5' 2\").    Weight as of this encounter: 68.3 kg (150 lb 8 oz).  Medication Review: complete    The next two questions are to help us understand your food security.  If you are feeling you need any assistance in this area, we have resources available to support you today.          1/21/2025   SDOH- Food Insecurity   Within the past 12 months, did you worry that your food would run out before you got money to buy more? N    Within the past 12 months, did the food you bought just not last and you didn t have money to get more? N        Proxy-reported         Health Care Directive:  Patient does not have a Health Care Directive: Discussed advance care planning with patient; however, patient declined at this time.    Blanca Rodriguez LPN      "

## 2025-01-21 NOTE — LETTER
January 22, 2025      Laverne Medina  [address]   [place] [address]        Dear ,    These are great results for non-fasting, screening labs!  We could check again in ~5 years.    Resulted Orders   Lipid Panel   Result Value Ref Range    Cholesterol 168 <200 mg/dL    Triglycerides 97 <150 mg/dL    Direct Measure HDL 75 >=50 mg/dL    LDL Cholesterol Calculated 74 <100 mg/dL    Non HDL Cholesterol 93 <130 mg/dL    Patient Fasting > 8hrs? No     Narrative    Cholesterol  Desirable: < 200 mg/dL  Borderline High: 200 - 239 mg/dL  High: >= 240 mg/dL    Triglycerides  Normal: < 150 mg/dL  Borderline High: 150 - 199 mg/dL  High: 200-499 mg/dL  Very High: >= 500 mg/dL    Direct Measure HDL  Female: >= 50 mg/dL   Male: >= 40 mg/dL    LDL Cholesterol  Desirable: < 100 mg/dL  Above Desirable: 100 - 129 mg/dL   Borderline High: 130 - 159 mg/dL   High:  160 - 189 mg/dL   Very High: >= 190 mg/dL    Non HDL Cholesterol  Desirable: < 130 mg/dL  Above Desirable: 130 - 159 mg/dL  Borderline High: 160 - 189 mg/dL  High: 190 - 219 mg/dL  Very High: >= 220 mg/dL   Basic Metabolic Panel   Result Value Ref Range    Sodium 141 135 - 145 mmol/L    Potassium 4.3 3.4 - 5.3 mmol/L    Chloride 103 98 - 107 mmol/L    Carbon Dioxide (CO2) 28 22 - 29 mmol/L    Anion Gap 10 7 - 15 mmol/L    Urea Nitrogen 9.1 6.0 - 20.0 mg/dL    Creatinine 0.71 0.51 - 0.95 mg/dL    GFR Estimate >90 >60 mL/min/1.73m2      Comment:      eGFR calculated using 2021 CKD-EPI equation.    Calcium 9.4 8.8 - 10.4 mg/dL      Comment:      Reference intervals for this test were updated on 7/16/2024 to reflect our healthy population more accurately. There may be differences in the flagging of prior results with similar values performed with this method. Those prior results can be interpreted in the context of the updated reference intervals.    Glucose 101 (H) 70 - 99 mg/dL    Patient Fasting > 8hrs? No    TSH   Result Value Ref Range    TSH 1.08 0.30 - 4.20 uIU/mL   T4,  Free   Result Value Ref Range    Free T4 1.00 0.90 - 1.70 ng/dL       If you have any questions or concerns, please call the clinic at the number listed above.       Sincerely,      Fabiola Nina, DO    Electronically signed

## 2025-01-21 NOTE — PROGRESS NOTES
"Preventive Care Visit  [facility]  Fabiola Nina DO, Family Medicine  Jan 21, 2025      Assessment & Plan     1. Routine history and physical examination of adult (Primary)  - Lipid Panel; Future  - Basic Metabolic Panel; Future  - TSH; Future  - T4, Free; Future  - Lipid Panel  - Basic Metabolic Panel  - TSH  - T4, Free    2. Decreased libido  2/2 to #3.  Discussed changing interactions with ; increased communication, date nights, talk through fears of future (oldest child will be leaving this spring/summer for working on the pipeline).  May improve with individual psychotherapy - local resources available.  Virtual counseling options include: [facility] or [facility].    3. Adjustment disorder with mixed emotional features  As above with oldest child graduated now from high school; and planning to leave for the pipeline this spring/summer.  Previously was a SAHM x 12 years; and is back to work.  Pursuing early education as well - in school.  Discussed consideration for individual psychotherapy to talk through significant life transition event.    4. Need for Tdap vaccination  Updated today.  - TDAP 7+ (ADACEL,BOOSTRIX)    5. Lipid screening  6. Diabetes mellitus screening  7. Family history of thyroid disease  Screening blood work obtained today.  - Lipid Panel; Future  - Lipid Panel  - Basic Metabolic Panel; Future  - Basic Metabolic Panel  - TSH; Future  - T4, Free; Future  - TSH  - T4, Free      Patient has been advised of split billing requirements and indicates understanding: Yes        BMI  Estimated body mass index is 27.53 kg/m  as calculated from the following:    Height as of this encounter: 1.575 m (5' 2\").    Weight as of this encounter: 68.3 kg (150 lb 8 oz).   Weight management plan: Discussed healthy diet and exercise guidelines    Counseling  Appropriate preventive services were addressed with this patient via screening, questionnaire, or " discussion as appropriate for fall prevention, nutrition, physical activity, Tobacco-use cessation, social engagement, weight loss and cognition.  Checklist reviewing preventive services available has been given to the patient.  Reviewed patient's diet, addressing concerns and/or questions.   She is at risk for lack of exercise and has been provided with information to increase physical activity for the benefit of her well-being.     No follow-ups on file.    Mayela Galloway is a 39 year old, presenting for the following:  No chief complaint on file.        1/21/2025     2:43 PM   Additional Questions   Roomed by DIMPLE Rios   Accompanied by self          HPI    She has had a vaginal hysterectomy in 2016.    Noticing decrease in sexual libido.  No interest.  Denies pain.  Did get a yeast infection from last intercourse (probably in August) - and is slightly worried about that occurring again.    Otherwise had oldest graduate high school this week.  Is staying around until summer so she can have a graduation party.  Plans to leave for the pipeline afterward.    She is back in school for early education; was a SAHM x 12 years.    Health Care Directive  Patient does not have a Health Care Directive: None, not discussed at first visit      1/21/2025   General Health   How would you rate your overall physical health? Excellent   Feel stress (tense, anxious, or unable to sleep) Not at all         1/21/2025   Nutrition   Three or more servings of calcium each day? Yes   Diet: Regular (no restrictions)   How many servings of fruit and vegetables per day? 4 or more   How many sweetened beverages each day? 0-1         1/21/2025   Exercise   Days per week of moderate/strenous exercise 3 days   Average minutes spent exercising at this level 30 min         1/21/2025   Social Factors   Frequency of gathering with friends or relatives Three times a week   Worry food won't last until get money to buy more No   Food not last or  not have enough money for food? No   Do you have housing? (Housing is defined as stable permanent housing and does not include staying ouside in a car, in a tent, in an abandoned building, in an overnight shelter, or couch-surfing.) Yes   Are you worried about losing your housing? No   Lack of transportation? No   Unable to get utilities (heat,electricity)? No         1/21/2025   Dental   Dentist two times every year? Yes         1/21/2025   TB Screening   Were you born outside of the [place]? No       Today's PHQ-9 Score:       1/21/2025     2:28 PM   PHQ-9 SCORE   PHQ-9 Total Score MyChart 0   PHQ-9 Total Score 0        Patient-reported         1/21/2025   Substance Use   Alcohol more than 3/day or more than 7/wk No   Do you use any other substances recreationally? No     Social History     Tobacco Use    Smoking status: Former     Current packs/day: 0.50     Average packs/day: 0.5 packs/day for 7.0 years (3.5 ttl pk-yrs)     Types: Cigarettes    Smokeless tobacco: Never   Vaping Use    Vaping status: Every Day    Substances: Nicotine   Substance Use Topics    Alcohol use: Yes     Comment: Alcoholic Drinks/day: moderate    Drug use: Unknown     Types: Other     Comment: Drug use: No          Mammogram Screening - Patient under 40 years of age: Routine Mammogram Screening not recommended.           1/21/2025   One time HIV Screening   Previous HIV test? No         1/21/2025   STI Screening   New sexual partner(s) since last STI/HIV test? No     History of abnormal Pap smear: Status post hysterectomy with removal of cervix and no history of CIN2 or greater or cervical cancer. Health Maintenance and Surgical History updated.            Reviewed and updated as needed this visit by Provider   Tobacco  Allergies  Meds  Problems  Med Hx  Surg Hx  Fam Hx          Mom: Anabella Weems  Pap: 9/5/14, NIL. Now s/p hyst.  Periods/Contraception: NA; s/p hyst  STI: No  FamHx early CA: No  Lipids/DM: routine screening.  Normal in  2018.  Mammo @ 42.  Lung: NA/former light smoker.  Dexa: NA/age  Colon @ 47  Tdap 9/10/12, DUE - will be updated today, 25, flu yearly - does not get, Shingrix @ 52, RSV @ 62, PNA @ 67.  Candidate for Covid series.  Has completed Hep B, MMR    Past Medical History:   Diagnosis Date    Appendicitis     2002    Personal history of other medical treatment (CODE)     G2 P 1-1-0-2    Presence of (intrauterine) contraceptive device     08,ParaGard IUD placed, removed 09 after her  had a vasectomy.     Past Surgical History:   Procedure Laterality Date    APPENDECTOMY OPEN      02,Open appendectomy for acute appendicitis     SECTION      06     SECTION      2008,Repeat  section -  labor    LAPAROSCOPIC ASSISTED HYSTERECTOMY VAGINAL  2016    laparoscopic assisted vaginal hysterectomy, bilateral salpingectomy, right ovarian cystectomy, subsequent right oophorectomy, messina culdoplasty, cystoscopy     OB History   No obstetric history on file.     BP Readings from Last 3 Encounters:   25 112/76   24 122/72   24 (!) 144/80    Wt Readings from Last 3 Encounters:   25 68.3 kg (150 lb 8 oz)   24 68.5 kg (151 lb)   24 70.5 kg (155 lb 8 oz)                  Patient Active Problem List   Diagnosis    Allergic rhinitis due to pollen    Dysmenorrhea    Headache    Major depressive disorder, recurrent episode    Plantar wart    Tobacco abuse     Past Surgical History:   Procedure Laterality Date    APPENDECTOMY OPEN      02,Open appendectomy for acute appendicitis     SECTION      06     SECTION      2008,Repeat  section -  labor    LAPAROSCOPIC ASSISTED HYSTERECTOMY VAGINAL  2016    laparoscopic assisted vaginal hysterectomy, bilateral salpingectomy, right ovarian cystectomy, subsequent right oophorectomy, messina culdoplasty, cystoscopy       Social History  "    Tobacco Use    Smoking status: Former     Current packs/day: 0.50     Average packs/day: 0.5 packs/day for 7.0 years (3.5 ttl pk-yrs)     Types: Cigarettes    Smokeless tobacco: Never   Substance Use Topics    Alcohol use: Yes     Comment: Alcoholic Drinks/day: moderate     Family History   Problem Relation Age of Onset    Heart Disease Mother     Other - See Comments Mother     Thyroid Disease Mother     Other - See Comments Father     Diabetes Type 1 Father     Hypertension Sister     Substance Abuse Sister         OD    Hypertension Brother     Thyroid Disease Maternal Grandmother     Genetic Disorder Other         Genetic,Sister Severe migraines followed by Dr. Bhardwaj -Mom Appendectomy, hysterectomy and hyperthyroidism.    Genetic Disorder Other         Genetic,Sister  age 19 of pneumonia-Mom Appendectomy, hysterectomy and hyperthyroidism.  -Father - Type II DM    Cervical Cancer Maternal Aunt     Breast Cancer Maternal Aunt          Current Outpatient Medications   Medication Sig Dispense Refill    ascorbic acid (VITAMIN C) 1000 MG TABS Take 1,000 mg by mouth daily      ondansetron (ZOFRAN-ODT) 4 MG ODT tab Place 4 mg under the tongue every 8 hours as needed.      triamcinolone (KENALOG) 0.1 % external cream Apply to affected areas of skin 1-2 times per day for up to 1 week at a time. 45 g 0     Allergies   Allergen Reactions    Latex Rash    Nitrofurantoin Rash        Objective    Exam  /76   Pulse 89   Temp 98.8  F (37.1  C) (Tympanic)   Resp 16   Ht 1.575 m (5' 2\")   Wt 68.3 kg (150 lb 8 oz)   SpO2 98%   BMI 27.53 kg/m     Estimated body mass index is 27.53 kg/m  as calculated from the following:    Height as of this encounter: 1.575 m (5' 2\").    Weight as of this encounter: 68.3 kg (150 lb 8 oz).    Physical Exam  GENERAL: alert and no distress  EYES: Eyes grossly normal to inspection, PERRL and conjunctivae and sclerae normal  HENT: ear canals and TM's normal, nose and mouth " without ulcers or lesions  NECK: no adenopathy, no asymmetry, masses, or scars  RESP: lungs clear to auscultation - no rales, rhonchi or wheezes  CV: regular rate and rhythm, normal S1 S2, no S3 or S4, no murmur, click or rub, no peripheral edema  ABDOMEN: soft, nontender, no hepatosplenomegaly, no masses and bowel sounds normal  MS: no gross musculoskeletal defects noted, no edema  SKIN: no suspicious lesions or rashes  PSYCH: mentation appears normal, affect normal/bright, and tearful at times      Signed Electronically by: Fabiola Nina DO    Answers submitted by the patient for this visit:  Patient Health Questionnaire (Submitted on 1/21/2025)  If you checked off any problems, how difficult have these problems made it for you to do your work, take care of things at home, or get along with other people?: Not difficult at all  PHQ9 TOTAL SCORE: 0  Patient Health Questionnaire (G7) (Submitted on 1/21/2025)  MERCEDES 7 TOTAL SCORE: 0